# Patient Record
Sex: FEMALE | Race: WHITE | NOT HISPANIC OR LATINO | Employment: UNEMPLOYED | URBAN - NONMETROPOLITAN AREA
[De-identification: names, ages, dates, MRNs, and addresses within clinical notes are randomized per-mention and may not be internally consistent; named-entity substitution may affect disease eponyms.]

---

## 2018-04-04 ENCOUNTER — OFFICE VISIT (OUTPATIENT)
Dept: OBSTETRICS AND GYNECOLOGY | Facility: CLINIC | Age: 48
End: 2018-04-04

## 2018-04-04 ENCOUNTER — APPOINTMENT (OUTPATIENT)
Dept: LAB | Facility: HOSPITAL | Age: 48
End: 2018-04-04

## 2018-04-04 VITALS
SYSTOLIC BLOOD PRESSURE: 128 MMHG | WEIGHT: 239 LBS | DIASTOLIC BLOOD PRESSURE: 68 MMHG | HEIGHT: 71 IN | BODY MASS INDEX: 33.46 KG/M2

## 2018-04-04 DIAGNOSIS — N94.6 DYSMENORRHEA: ICD-10-CM

## 2018-04-04 DIAGNOSIS — R53.83 OTHER FATIGUE: ICD-10-CM

## 2018-04-04 DIAGNOSIS — N92.1 MENORRHAGIA WITH IRREGULAR CYCLE: Primary | ICD-10-CM

## 2018-04-04 LAB
ALBUMIN SERPL-MCNC: 4.2 G/DL (ref 3.4–4.8)
ALBUMIN/GLOB SERPL: 1.3 G/DL (ref 1.1–1.8)
ALP SERPL-CCNC: 79 U/L (ref 38–126)
ALT SERPL W P-5'-P-CCNC: 33 U/L (ref 9–52)
ANION GAP SERPL CALCULATED.3IONS-SCNC: 12 MMOL/L (ref 5–15)
AST SERPL-CCNC: 25 U/L (ref 14–36)
BASOPHILS # BLD AUTO: 0.06 10*3/MM3 (ref 0–0.2)
BASOPHILS NFR BLD AUTO: 0.6 % (ref 0–2)
BILIRUB SERPL-MCNC: <0.1 MG/DL (ref 0.2–1.3)
BUN BLD-MCNC: 18 MG/DL (ref 7–21)
BUN/CREAT SERPL: 30.5 (ref 7–25)
CALCIUM SPEC-SCNC: 9.2 MG/DL (ref 8.4–10.2)
CHLORIDE SERPL-SCNC: 99 MMOL/L (ref 95–110)
CO2 SERPL-SCNC: 26 MMOL/L (ref 22–31)
CREAT BLD-MCNC: 0.59 MG/DL (ref 0.5–1)
DEPRECATED RDW RBC AUTO: 41.9 FL (ref 36.4–46.3)
EOSINOPHIL # BLD AUTO: 0.2 10*3/MM3 (ref 0–0.7)
EOSINOPHIL NFR BLD AUTO: 1.9 % (ref 0–7)
ERYTHROCYTE [DISTWIDTH] IN BLOOD BY AUTOMATED COUNT: 12.8 % (ref 11.5–14.5)
FSH SERPL-ACNC: 6.3 MIU/ML
GFR SERPL CREATININE-BSD FRML MDRD: 109 ML/MIN/1.73 (ref 58–135)
GLOBULIN UR ELPH-MCNC: 3.2 GM/DL (ref 2.3–3.5)
GLUCOSE BLD-MCNC: 111 MG/DL (ref 60–100)
HBA1C MFR BLD: 5.4 % (ref 4–5.6)
HCT VFR BLD AUTO: 37.7 % (ref 35–45)
HGB BLD-MCNC: 13.4 G/DL (ref 12–15.5)
IMM GRANULOCYTES # BLD: 0.03 10*3/MM3 (ref 0–0.02)
IMM GRANULOCYTES NFR BLD: 0.3 % (ref 0–0.5)
LH SERPL-ACNC: 9.87 MIU/ML
LYMPHOCYTES # BLD AUTO: 2.82 10*3/MM3 (ref 0.6–4.2)
LYMPHOCYTES NFR BLD AUTO: 26.9 % (ref 10–50)
MCH RBC QN AUTO: 32 PG (ref 26.5–34)
MCHC RBC AUTO-ENTMCNC: 35.5 G/DL (ref 31.4–36)
MCV RBC AUTO: 90 FL (ref 80–98)
MONOCYTES # BLD AUTO: 0.64 10*3/MM3 (ref 0–0.9)
MONOCYTES NFR BLD AUTO: 6.1 % (ref 0–12)
NEUTROPHILS # BLD AUTO: 6.74 10*3/MM3 (ref 2–8.6)
NEUTROPHILS NFR BLD AUTO: 64.2 % (ref 37–80)
PLATELET # BLD AUTO: 366 10*3/MM3 (ref 150–450)
PMV BLD AUTO: 9.6 FL (ref 8–12)
POTASSIUM BLD-SCNC: 3.7 MMOL/L (ref 3.5–5.1)
PROT SERPL-MCNC: 7.4 G/DL (ref 6.3–8.6)
RBC # BLD AUTO: 4.19 10*6/MM3 (ref 3.77–5.16)
SODIUM BLD-SCNC: 137 MMOL/L (ref 137–145)
TSH SERPL DL<=0.05 MIU/L-ACNC: 1.08 MIU/ML (ref 0.46–4.68)
WBC NRBC COR # BLD: 10.49 10*3/MM3 (ref 3.2–9.8)

## 2018-04-04 PROCEDURE — 83001 ASSAY OF GONADOTROPIN (FSH): CPT | Performed by: NURSE PRACTITIONER

## 2018-04-04 PROCEDURE — 99214 OFFICE O/P EST MOD 30 MIN: CPT | Performed by: NURSE PRACTITIONER

## 2018-04-04 PROCEDURE — 85025 COMPLETE CBC W/AUTO DIFF WBC: CPT | Performed by: NURSE PRACTITIONER

## 2018-04-04 PROCEDURE — 80053 COMPREHEN METABOLIC PANEL: CPT | Performed by: NURSE PRACTITIONER

## 2018-04-04 PROCEDURE — 83525 ASSAY OF INSULIN: CPT | Performed by: NURSE PRACTITIONER

## 2018-04-04 PROCEDURE — 82670 ASSAY OF TOTAL ESTRADIOL: CPT | Performed by: NURSE PRACTITIONER

## 2018-04-04 PROCEDURE — 84144 ASSAY OF PROGESTERONE: CPT | Performed by: NURSE PRACTITIONER

## 2018-04-04 PROCEDURE — 84443 ASSAY THYROID STIM HORMONE: CPT | Performed by: NURSE PRACTITIONER

## 2018-04-04 PROCEDURE — 36415 COLL VENOUS BLD VENIPUNCTURE: CPT | Performed by: NURSE PRACTITIONER

## 2018-04-04 PROCEDURE — 83002 ASSAY OF GONADOTROPIN (LH): CPT | Performed by: NURSE PRACTITIONER

## 2018-04-04 PROCEDURE — 83036 HEMOGLOBIN GLYCOSYLATED A1C: CPT | Performed by: NURSE PRACTITIONER

## 2018-04-04 RX ORDER — MEDROXYPROGESTERONE ACETATE 10 MG/1
10 TABLET ORAL DAILY
Qty: 10 TABLET | Refills: 0 | Status: SHIPPED | OUTPATIENT
Start: 2018-04-04 | End: 2018-04-12

## 2018-04-06 LAB
ESTRADIOL SERPL HS-MCNC: 304.9 PG/ML
INSULIN SERPL-ACNC: 17.5 UIU/ML (ref 2.6–24.9)
PROGEST SERPL-MCNC: <0.1 NG/ML

## 2018-04-10 PROBLEM — N92.1 MENORRHAGIA WITH IRREGULAR CYCLE: Status: ACTIVE | Noted: 2018-04-10

## 2018-04-10 NOTE — PROGRESS NOTES
Subjective   Chief Complaint   Patient presents with   • Menstrual Problem     Maria Fernanda Lopez is a 48 y.o. year old  presenting to be seen with complaints of trouble with her menses.   Current birth control method: none.    Patient's last menstrual period was 2018.    The last time she recalls having predictable regular cycles was 2017. Started a period on  and has had daily bleeding since then. Some days are just light spotting and some she's changing an overnight pad every hour. She's having a lot of cramping and just feels rundown. She has always had slightly irregular periods but they'll be late by 1-3 weeks and she's never had more than 1 per month or had one last for more than about 10 days. She's currently been bleeding for appx 3 months straight.      · Additional notable symptoms: none  · Changes in weight: weight has been stable  · Recent increase in stress? no  · Is there associated pain ? yes    Past 6 month menstrual history:    Cycle Frequency: vary between 28 and 45 days   Menstrual cycle character: flow is typically normal and flow is typically moderately heavy   Cycle Duration: 5 - 7   Number of heavy days of flows: 3   Dysmenorrhea: moderate and affecting her activities of daily living   PMS: moderate and is not affecting her activities of daily living   Intermenstrual bleeding present: {yes   Post-coital bleeding present: no     She is sexually active.  In the past 12 months there has not been new sexual partners.  Condoms are not typically used.  She would not like to be screened for STD's at today's exam.     No Additional Complaints Reported    The following portions of the patient's history were reviewed and updated as appropriate:problem list, current medications, allergies, past family history, past medical history, past social history and past surgical history    Smoking status: Current Every Day Smoker                                                    "Packs/day: 0.50      Years: 20.00        Types: Cigarettes  Smokeless tobacco: Never Used                        Review of Systems   Constitutional: Positive for fatigue. Negative for activity change, appetite change, chills, diaphoresis, fever and unexpected weight change.   HENT: Negative for trouble swallowing and voice change.    Respiratory: Negative for chest tightness and shortness of breath.    Cardiovascular: Negative for chest pain and palpitations.   Gastrointestinal: Negative for abdominal distention, abdominal pain, anal bleeding, blood in stool, nausea and vomiting.   Endocrine: Negative.    Genitourinary: Positive for menstrual problem and vaginal bleeding. Negative for dyspareunia, pelvic pain, vaginal discharge and vaginal pain.   Musculoskeletal: Negative for myalgias.   Skin: Negative for color change and pallor.   Neurological: Negative for dizziness, weakness, light-headedness and headaches.   Hematological: Negative for adenopathy.   Psychiatric/Behavioral: Negative for decreased concentration, dysphoric mood and sleep disturbance. The patient is not nervous/anxious.          Objective   /68   Ht 180.3 cm (71\")   Wt 108 kg (239 lb)   LMP 01/04/2018 Comment: cycle for last 3 months  BMI 33.33 kg/m²     General:  well developed; well nourished  no acute distress  appears older than stated age  obese - Body mass index is 33.33 kg/m².   Skin:  No suspicious lesions seen   Thyroid: normal to inspection and palpation   Lungs:  breathing is unlabored  clear to auscultation bilaterally   Heart:  regular rate and rhythm, S1, S2 normal, no murmur, click, rub or gallop  normal apical impulse   Breasts:  Not performed.   Abdomen: Not performed.   Pelvis: Not performed.     Lab Review   Last pap 6/2015 normal; no other labs available.     Imaging   No data reviewed         Diagnoses and all orders for this visit:    Menorrhagia with irregular cycle  -     CBC Auto Differential  -     Comprehensive " Metabolic Panel  -     Estradiol  -     Follicle Stimulating Hormone  -     Hemoglobin A1c  -     Insulin, Total  -     Luteinizing Hormone  -     Progesterone  -     TSH  -     US Non-ob Transvaginal; Future    Other fatigue    Dysmenorrhea    Other orders  -     medroxyPROGESTERone (PROVERA) 10 MG tablet; Take 1 tablet by mouth Daily.        New Medications Ordered This Visit   Medications   • medroxyPROGESTERone (PROVERA) 10 MG tablet     Sig: Take 1 tablet by mouth Daily.     Dispense:  10 tablet     Refill:  0     Due for pap smear once bleeding stops. Will have labs drawn today; will call with STAT CBC results if needed. Plan for TVUS at next available appt and will f/u with me afterwards for results and to discuss plan. Start on Provera in attempt to decrease bleeding; R/B/A and administration discussed.     This note was electronically signed.    Jade Herron, MOISE    April 10, 2018

## 2018-04-12 ENCOUNTER — OFFICE VISIT (OUTPATIENT)
Dept: OBSTETRICS AND GYNECOLOGY | Facility: CLINIC | Age: 48
End: 2018-04-12

## 2018-04-12 VITALS
HEIGHT: 71 IN | DIASTOLIC BLOOD PRESSURE: 79 MMHG | HEART RATE: 91 BPM | SYSTOLIC BLOOD PRESSURE: 120 MMHG | BODY MASS INDEX: 33.46 KG/M2 | WEIGHT: 239 LBS

## 2018-04-12 DIAGNOSIS — D25.9 UTERINE LEIOMYOMA, UNSPECIFIED LOCATION: ICD-10-CM

## 2018-04-12 DIAGNOSIS — N92.1 MENORRHAGIA WITH IRREGULAR CYCLE: ICD-10-CM

## 2018-04-12 DIAGNOSIS — N94.6 DYSMENORRHEA: ICD-10-CM

## 2018-04-12 DIAGNOSIS — Z01.411 ENCOUNTER FOR GYNECOLOGICAL EXAMINATION WITH ABNORMAL FINDING: Primary | ICD-10-CM

## 2018-04-12 DIAGNOSIS — Z30.011 ENCOUNTER FOR INITIAL PRESCRIPTION OF CONTRACEPTIVE PILLS: ICD-10-CM

## 2018-04-12 PROCEDURE — 99396 PREV VISIT EST AGE 40-64: CPT | Performed by: NURSE PRACTITIONER

## 2018-04-12 PROCEDURE — G0123 SCREEN CERV/VAG THIN LAYER: HCPCS | Performed by: NURSE PRACTITIONER

## 2018-04-12 PROCEDURE — 87624 HPV HI-RISK TYP POOLED RSLT: CPT | Performed by: NURSE PRACTITIONER

## 2018-04-12 RX ORDER — ACETAMINOPHEN AND CODEINE PHOSPHATE 120; 12 MG/5ML; MG/5ML
1 SOLUTION ORAL DAILY
Qty: 28 TABLET | Refills: 12 | Status: SHIPPED | OUTPATIENT
Start: 2018-04-12 | End: 2018-05-01

## 2018-04-12 NOTE — PROGRESS NOTES
Subjective   Maria Fernanda Lopez is a 48 y.o. She is due for a pap smear but is here for her ultrasound results. Still having some spotting but has improved since first seen last week.    LMP- 1/4 to current. Has not had 1 day without bleeding since January. Some days are spotting and others are excessive. She has a lot of pain as well.  Last pap- 6/15/15 normal      Menstrual Problem   This is a recurrent problem. The current episode started more than 1 month ago. The problem occurs constantly. The problem has been waxing and waning. Associated symptoms include abdominal pain, fatigue and weakness. Pertinent negatives include no anorexia, chest pain, diaphoresis, headaches, myalgias, nausea, urinary symptoms or vomiting. Nothing aggravates the symptoms. She has tried nothing for the symptoms.       The following portions of the patient's history were reviewed and updated as appropriate: allergies, current medications, past family history, past medical history, past social history, past surgical history and problem list.    Review of Systems   Constitutional: Positive for fatigue. Negative for diaphoresis.   Respiratory: Negative for chest tightness and shortness of breath.    Cardiovascular: Negative for chest pain and palpitations.   Gastrointestinal: Positive for abdominal pain. Negative for abdominal distention, anorexia, nausea and vomiting.   Genitourinary: Positive for dyspareunia, menstrual problem, pelvic pain and vaginal bleeding. Negative for vaginal discharge and vaginal pain.   Musculoskeletal: Negative for myalgias.   Skin: Negative for color change and pallor.   Neurological: Positive for weakness. Negative for dizziness, light-headedness and headaches.       Objective   Physical Exam   Constitutional: She is oriented to person, place, and time. She appears well-developed and well-nourished.   Neck: No thyromegaly present.   Cardiovascular: Normal rate, regular rhythm, normal heart sounds and intact  distal pulses.    Pulmonary/Chest: Effort normal and breath sounds normal. Right breast exhibits no inverted nipple, no mass, no nipple discharge, no skin change and no tenderness. Left breast exhibits no inverted nipple, no mass, no nipple discharge, no skin change and no tenderness. Breasts are symmetrical.   Abdominal: Soft. Bowel sounds are normal. She exhibits no distension. There is no tenderness.   Genitourinary: No breast discharge or bleeding. There is no rash, tenderness, lesion or injury on the right labia. There is no rash, tenderness, lesion or injury on the left labia. Cervix exhibits no motion tenderness, no discharge and no friability. Right adnexum displays no mass, no tenderness and no fullness. Left adnexum displays no mass, no tenderness and no fullness. There is bleeding in the vagina. No erythema or tenderness in the vagina. No foreign body in the vagina. No signs of injury around the vagina. No vaginal discharge found.   Genitourinary Comments: Light spotting noted. Pap smear obtained. Exam limited 2/2 body habitus.   Lymphadenopathy:     She has no axillary adenopathy.        Right: No inguinal adenopathy present.        Left: No inguinal adenopathy present.   Neurological: She is alert and oriented to person, place, and time.   Skin: Skin is warm, dry and intact.   Psychiatric: She has a normal mood and affect. Her speech is normal and behavior is normal.   Nursing note and vitals reviewed.    Preliminary pelvic u/s revealed a normal sized uterus with endometrial thickness of 4.2mm. The right ovary is WNL and the left ovary is enlarged and contains at 2.8cm simple cyst. There are probable fibroids within the uterus; the largest is noted in the fundus and measures 3.77cm.     Component      Latest Ref Rng & Units 4/4/2018   WBC      3.20 - 9.80 10*3/mm3 10.49 (H)   RBC      3.77 - 5.16 10*6/mm3 4.19   Hemoglobin      12.0 - 15.5 g/dL 13.4   Hematocrit      35.0 - 45.0 % 37.7   MCV      80.0 -  98.0 fL 90.0   MCH      26.5 - 34.0 pg 32.0   MCHC      31.4 - 36.0 g/dL 35.5   RDW      11.5 - 14.5 % 12.8   RDW-SD      36.4 - 46.3 fl 41.9   MPV      8.0 - 12.0 fL 9.6   Platelets      150 - 450 10*3/mm3 366   Neutrophil %      37.0 - 80.0 % 64.2   Lymphocyte %      10.0 - 50.0 % 26.9   Monocyte %      0.0 - 12.0 % 6.1   Eosinophil %      0.0 - 7.0 % 1.9   Basophil %      0.0 - 2.0 % 0.6   Immature Grans %      0.0 - 0.5 % 0.3   Neutrophils, Absolute      2.00 - 8.60 10*3/mm3 6.74   Lymphocytes, Absolute      0.60 - 4.20 10*3/mm3 2.82   Monocytes, Absolute      0.00 - 0.90 10*3/mm3 0.64   Eosinophils, Absolute      0.00 - 0.70 10*3/mm3 0.20   Basophils, Absolute      0.00 - 0.20 10*3/mm3 0.06   Immature Grans, Absolute      0.00 - 0.02 10*3/mm3 0.03 (H)   Glucose      60 - 100 mg/dL 111 (H)   BUN      7 - 21 mg/dL 18   Creatinine      0.50 - 1.00 mg/dL 0.59   Sodium      137 - 145 mmol/L 137   Potassium      3.5 - 5.1 mmol/L 3.7   Chloride      95 - 110 mmol/L 99   CO2      22.0 - 31.0 mmol/L 26.0   Calcium      8.4 - 10.2 mg/dL 9.2   Total Protein      6.3 - 8.6 g/dL 7.4   Albumin      3.40 - 4.80 g/dL 4.20   ALT (SGPT)      9 - 52 U/L 33   AST (SGOT)      14 - 36 U/L 25   Alkaline Phosphatase      38 - 126 U/L 79   Total Bilirubin      0.2 - 1.3 mg/dL <0.1 (L)   eGFR Non  Amer      58 - 135 mL/min/1.73 109   Globulin      2.3 - 3.5 gm/dL 3.2   A/G Ratio      1.1 - 1.8 g/dL 1.3   BUN/Creatinine Ratio      7.0 - 25.0 30.5 (H)   Anion Gap      5.0 - 15.0 mmol/L 12.0   Estradiol      pg/mL 304.9   FSH      mIU/mL 6.30   Hemoglobin A1C      4 - 5.6 % 5.4   Insulin      2.6 - 24.9 uIU/mL 17.5   LH      mIU/mL 9.87   Progesterone      ng/mL <0.1   TSH Baseline      0.460 - 4.680 mIU/mL 1.080     Labs WNL except for the low serum progesterone.     Assessment/Plan   Maria Fernanda was seen today for follow-up.    Diagnoses and all orders for this visit:    Encounter for gynecological examination with abnormal finding  -      Liquid-based Pap Smear, Screening    Uterine leiomyoma, unspecified location    Menorrhagia with irregular cycle  -     norethindrone (MICRONOR) 0.35 MG tablet; Take 1 tablet by mouth Daily.    Encounter for initial prescription of contraceptive pills    Dysmenorrhea      We discussed her u/s, lab results and treatment options including POPs, Depo, Nexplanon, IUD, or surgical management. She wants to try the POPs for now but would also like to know more about her surgical options since she has so much pain as well. R/B/A and administration of POPs discussed. See MD for surgical options at next available appointment .

## 2018-04-16 LAB
LAB AP CASE REPORT: NORMAL
LAB AP GYN ADDITIONAL INFORMATION: NORMAL
LAB AP GYN OTHER FINDINGS: NORMAL
Lab: NORMAL
PATH INTERP SPEC-IMP: NORMAL
STAT OF ADQ CVX/VAG CYTO-IMP: NORMAL

## 2018-04-20 LAB — HPV I/H RISK 4 DNA CVX QL PROBE+SIG AMP: POSITIVE

## 2018-04-28 ENCOUNTER — HOSPITAL ENCOUNTER (EMERGENCY)
Facility: HOSPITAL | Age: 48
Discharge: HOME | End: 2018-04-28
Attending: EMERGENCY MEDICINE | Admitting: EMERGENCY MEDICINE

## 2018-04-28 ENCOUNTER — APPOINTMENT (OUTPATIENT)
Dept: GENERAL RADIOLOGY | Facility: HOSPITAL | Age: 48
End: 2018-04-28

## 2018-04-28 VITALS
BODY MASS INDEX: 30.8 KG/M2 | HEART RATE: 82 BPM | DIASTOLIC BLOOD PRESSURE: 71 MMHG | OXYGEN SATURATION: 96 % | RESPIRATION RATE: 18 BRPM | SYSTOLIC BLOOD PRESSURE: 126 MMHG | TEMPERATURE: 98.2 F | WEIGHT: 220 LBS | HEIGHT: 71 IN

## 2018-04-28 DIAGNOSIS — S76.312A PARTIAL HAMSTRING TEAR, LEFT, INITIAL ENCOUNTER: Primary | ICD-10-CM

## 2018-04-28 PROCEDURE — 25010000002 MORPHINE PER 10 MG

## 2018-04-28 PROCEDURE — 96372 THER/PROPH/DIAG INJ SC/IM: CPT

## 2018-04-28 PROCEDURE — 99283 EMERGENCY DEPT VISIT LOW MDM: CPT

## 2018-04-28 PROCEDURE — 73552 X-RAY EXAM OF FEMUR 2/>: CPT

## 2018-04-28 PROCEDURE — 25010000002 MORPHINE PER 10 MG: Performed by: EMERGENCY MEDICINE

## 2018-04-28 PROCEDURE — 25010000002 KETOROLAC TROMETHAMINE PER 15 MG: Performed by: EMERGENCY MEDICINE

## 2018-04-28 RX ORDER — ONDANSETRON 4 MG/1
4 TABLET, ORALLY DISINTEGRATING ORAL ONCE
Status: COMPLETED | OUTPATIENT
Start: 2018-04-28 | End: 2018-04-28

## 2018-04-28 RX ORDER — MORPHINE SULFATE 4 MG/ML
INJECTION, SOLUTION INTRAMUSCULAR; INTRAVENOUS
Status: COMPLETED
Start: 2018-04-28 | End: 2018-04-28

## 2018-04-28 RX ORDER — NAPROXEN 500 MG/1
500 TABLET ORAL 2 TIMES DAILY PRN
Qty: 30 TABLET | Refills: 0 | Status: SHIPPED | OUTPATIENT
Start: 2018-04-28 | End: 2018-05-16

## 2018-04-28 RX ORDER — KETOROLAC TROMETHAMINE 30 MG/ML
30 INJECTION, SOLUTION INTRAMUSCULAR; INTRAVENOUS ONCE
Status: COMPLETED | OUTPATIENT
Start: 2018-04-28 | End: 2018-04-28

## 2018-04-28 RX ORDER — HYDROCODONE BITARTRATE AND ACETAMINOPHEN 5; 325 MG/1; MG/1
1 TABLET ORAL EVERY 6 HOURS PRN
Qty: 15 TABLET | Refills: 0 | Status: SHIPPED | OUTPATIENT
Start: 2018-04-28 | End: 2018-05-01 | Stop reason: SDUPTHER

## 2018-04-28 RX ORDER — MORPHINE SULFATE 4 MG/ML
8 INJECTION, SOLUTION INTRAMUSCULAR; INTRAVENOUS ONCE
Status: COMPLETED | OUTPATIENT
Start: 2018-04-28 | End: 2018-04-28

## 2018-04-28 RX ORDER — MORPHINE SULFATE 2 MG/ML
8 INJECTION, SOLUTION INTRAMUSCULAR; INTRAVENOUS ONCE
Status: DISCONTINUED | OUTPATIENT
Start: 2018-04-28 | End: 2018-04-28 | Stop reason: SDUPTHER

## 2018-04-28 RX ADMIN — MORPHINE SULFATE 8 MG: 4 INJECTION, SOLUTION INTRAMUSCULAR; INTRAVENOUS at 16:45

## 2018-04-28 RX ADMIN — ONDANSETRON 4 MG: 4 TABLET, ORALLY DISINTEGRATING ORAL at 15:36

## 2018-04-28 RX ADMIN — MORPHINE SULFATE 8 MG: 4 INJECTION, SOLUTION INTRAMUSCULAR; INTRAVENOUS at 14:59

## 2018-04-28 RX ADMIN — KETOROLAC TROMETHAMINE 30 MG: 30 INJECTION, SOLUTION INTRAMUSCULAR; INTRAVENOUS at 15:00

## 2018-05-01 ENCOUNTER — OFFICE VISIT (OUTPATIENT)
Dept: ORTHOPEDIC SURGERY | Facility: CLINIC | Age: 48
End: 2018-05-01

## 2018-05-01 VITALS — BODY MASS INDEX: 33.6 KG/M2 | WEIGHT: 240 LBS | HEIGHT: 71 IN

## 2018-05-01 DIAGNOSIS — S76.302A LEFT HAMSTRING INJURY, INITIAL ENCOUNTER: ICD-10-CM

## 2018-05-01 DIAGNOSIS — M79.605 PAIN OF LEFT LOWER EXTREMITY: Primary | ICD-10-CM

## 2018-05-01 PROCEDURE — 99214 OFFICE O/P EST MOD 30 MIN: CPT | Performed by: NURSE PRACTITIONER

## 2018-05-01 RX ORDER — TIZANIDINE HYDROCHLORIDE 4 MG/1
4 CAPSULE, GELATIN COATED ORAL 3 TIMES DAILY PRN
Qty: 90 CAPSULE | Refills: 1 | Status: SHIPPED | OUTPATIENT
Start: 2018-05-01 | End: 2018-05-31

## 2018-05-01 RX ORDER — HYDROCODONE BITARTRATE AND ACETAMINOPHEN 5; 325 MG/1; MG/1
1 TABLET ORAL EVERY 6 HOURS PRN
Qty: 40 TABLET | Refills: 0 | Status: SHIPPED | OUTPATIENT
Start: 2018-05-01 | End: 2018-05-16

## 2018-05-01 NOTE — PROGRESS NOTES
Maria Fernanda Lopez is a 48 y.o. female   Primary provider:  No Known Provider       Chief Complaint   Patient presents with   • Left Leg - Pain     Torn Hamstring,  Xray 4/28/18  [facility]       HISTORY OF PRESENT ILLNESS:    48-year-old female patient presents to office for evaluation of left posterior leg pain/injury.  Initial injury occurred while bowling on 4/28/2018.  Patient reports that she went to throw her bowling ball and her left leg hyperextended forward and outward. Patient reports she felt a tear and immediate, severe pain in her posterior upper leg and difficulty ambulating.  Patient was seen in the ED on that date with x-rays done crutches provided.  Patient was prescribed Norco and Naproxen for pain.  Pain is described as constant and severe.  Pain is described as crushing and aching in nature with associated swelling of her posterior upper left leg.  Pain is worse with movement of her left leg, standing, walking and sitting.  Pain improves some with crutches, rest, anti-inflammatory medication and pain medication.       Pain   This is a new problem. Episode onset: 4/28/18, felt a tear in her leg while bowling. The problem occurs constantly. The problem has been unchanged. Associated symptoms include weakness (Left leg). Associated symptoms comments: Aching, swelling . The symptoms are aggravated by walking and standing (sitting, movement/use of left leg). She has tried NSAIDs, oral narcotics and rest for the symptoms. The treatment provided mild relief.        CONCURRENT MEDICAL HISTORY:    No past medical history on file.    No Known Allergies      Current Outpatient Prescriptions:   •  albuterol (PROVENTIL HFA;VENTOLIN HFA) 108 (90 Base) MCG/ACT inhaler, Inhale 2 puffs Every 4 (Four) Hours As Needed for Wheezing or Shortness of Air., Disp: 1 inhaler, Rfl: 0  •  HYDROcodone-acetaminophen (NORCO) 5-325 MG per tablet, Take 1 tablet by mouth Every 6 (Six) Hours As Needed for Severe Pain .,  "Disp: 40 tablet, Rfl: 0  •  naproxen (NAPROSYN) 500 MG tablet, Take 1 tablet by mouth 2 (Two) Times a Day As Needed for Mild Pain ., Disp: 30 tablet, Rfl: 0  •  TiZANidine (ZANAFLEX) 4 MG capsule, Take 1 capsule by mouth 3 (Three) Times a Day As Needed for Muscle Spasms., Disp: 90 capsule, Rfl: 1    No past surgical history on file.    No family history on file.    Social History     Social History   • Marital status: Significant Other     Spouse name: N/A   • Number of children: N/A   • Years of education: N/A     Occupational History   • Not on file.     Social History Main Topics   • Smoking status: Current Every Day Smoker     Packs/day: 0.50     Years: 20.00     Types: Cigarettes   • Smokeless tobacco: Never Used   • Alcohol use No   • Drug use: No   • Sexual activity: Not Currently     Other Topics Concern   • Not on file     Social History Narrative   • No narrative on file        Review of Systems   HENT: Negative.    Eyes: Negative.    Respiratory: Negative.    Cardiovascular: Negative.    Gastrointestinal: Negative.    Endocrine: Negative.    Genitourinary: Negative.    Musculoskeletal: Positive for gait problem.        Left posterior upper leg pain.    Skin: Negative.    Allergic/Immunologic: Negative.    Neurological: Positive for weakness (Left leg).   Hematological: Negative.    Psychiatric/Behavioral: Negative.        PHYSICAL EXAMINATION:       Ht 180.3 cm (71\")   Wt 109 kg (240 lb)   LMP 04/12/2018 (Approximate)   BMI 33.47 kg/m²     Physical Exam   Constitutional: She is oriented to person, place, and time. Vital signs are normal. She appears well-developed and well-nourished. She is active and cooperative. She does not appear ill. No distress.   HENT:   Head: Normocephalic.   Pulmonary/Chest: Effort normal. No respiratory distress.   Abdominal: Soft. She exhibits no distension.   Musculoskeletal: She exhibits edema (Mild, left upper posterior leg) and tenderness (Left posterior upper leg). She " exhibits no deformity.   Neurological: She is alert and oriented to person, place, and time. GCS eye subscore is 4. GCS verbal subscore is 5. GCS motor subscore is 6.   Skin: Skin is warm, dry and intact. Capillary refill takes less than 2 seconds.   Psychiatric: She has a normal mood and affect. Her speech is normal and behavior is normal. Judgment and thought content normal. Cognition and memory are normal.   Vitals reviewed.      GAIT:     []  Normal  [x]  Antalgic    Assistive device: [x]  None  []  Walker     []  Crutches  []  Cane     []  Wheelchair  []  Stretcher    Right Hip Exam     Tenderness   The patient is experiencing no tenderness.         Range of Motion   The patient has normal right hip ROM.    Muscle Strength   The patient has normal right hip strength.    Other   Erythema: absent  Sensation: normal  Pulse: present      Left Hip Exam     Tenderness   The patient is experiencing tenderness in the posterior (upper leg, hamstring).    Range of Motion   Flexion: abnormal   Abduction: abnormal   Adduction: abnormal     Other   Erythema: absent  Sensation: normal  Pulse: present    Comments:  Diffuse tenderness to left hamstring. Small area of ecchymosis noted to posterior upper leg. Mild, generalized swelling. Pain with range of motion.             Xr Femur 2 View Left    Result Date: 4/28/2018  Narrative: EXAM DESCRIPTION: Left femur two views. CLINICAL HISTORY: Status post trauma. COMPARISON: None FINDINGS:  AP and lateral projections of the left femur are obtained. The alignment and mineralization are normal. The articular surfaces are smooth. No evidence of fracture, dislocation, or suspicious osseous lesion.     Impression: Negative for fracture or dislocation. Electronically signed by:  Mati Gutierrez MD  4/28/2018 3:08 PM CDT Workstation: [id number]        ASSESSMENT:    Diagnoses and all orders for this visit:    Pain of left lower extremity    Left hamstring injury, initial encounter    Other  orders  -     HYDROcodone-acetaminophen (NORCO) 5-325 MG per tablet; Take 1 tablet by mouth Every 6 (Six) Hours As Needed for Severe Pain .  -     TiZANidine (ZANAFLEX) 4 MG capsule; Take 1 capsule by mouth 3 (Three) Times a Day As Needed for Muscle Spasms.    PLAN    X-rays of left femur done in the ED on 4/28/2018 reviewed today. Mechanism of injury, subjective complaints and physical exam are most consistent with left hamstring injury. Discussed tear versus strain. Discussed MRI for definitive diagnosis and the fact that it would likely not change the treatment recommendations or plan of care. Patient is in agreement with waiting/deferring MRI for now. Recommend to continue with rest and modified activity. Recommend to continue with modified weight-bearing with use of her crutches. She is not using them in office today and is limping around when she ambulates. Discussed importance of using her crutches. Recommend to continue Norco as needed for moderate to severe pain. This is refilled today for patient. Recommend to continue Naproxen twice daily, as previously prescribed, for consistent dosing of oral NSAIDs. Zanaflex is also prescribed as needed for pain/muscle spasms.  Recommend ice therapy to the left hamstring intermittently 3-4 times daily for 20 minutes at a time.  Also discussed with patient alternating with heat therapy as needed for muscle spasms.  Follow-up in 4 weeks for recheck.  Consider referral to physical therapy at that time unless she has had significant improvement.    This patient has tried treatment modalities that include anti-inflammatory medication, Tylenol, rest, modified activity, modified weight-bearing with use of crutches and has sustained a traumatic injury.  Therefore, I will recommend a short course of narcotic pain medication for this patient until their pain has been sufficiently reduced to a level that I deem acceptable to be treated with alternative treatment options.  JH  reviewed # [id number].     Return in about 4 weeks (around 5/29/2018) for Recheck.      This document has been electronically signed by MOISE Jerome on May 1, 2018 12:06 PM      MOISE Jerome

## 2018-05-16 ENCOUNTER — OFFICE VISIT (OUTPATIENT)
Dept: OBSTETRICS AND GYNECOLOGY | Facility: CLINIC | Age: 48
End: 2018-05-16

## 2018-05-16 ENCOUNTER — PREP FOR SURGERY (OUTPATIENT)
Dept: OTHER | Facility: HOSPITAL | Age: 48
End: 2018-05-16

## 2018-05-16 VITALS
HEIGHT: 69 IN | SYSTOLIC BLOOD PRESSURE: 110 MMHG | BODY MASS INDEX: 35.55 KG/M2 | DIASTOLIC BLOOD PRESSURE: 80 MMHG | WEIGHT: 240 LBS

## 2018-05-16 DIAGNOSIS — N93.9 ABNORMAL UTERINE BLEEDING (AUB): Primary | ICD-10-CM

## 2018-05-16 DIAGNOSIS — D25.1 INTRAMURAL LEIOMYOMA OF UTERUS: ICD-10-CM

## 2018-05-16 DIAGNOSIS — N94.6 DYSMENORRHEA: ICD-10-CM

## 2018-05-16 DIAGNOSIS — N94.10 DYSPAREUNIA IN FEMALE: ICD-10-CM

## 2018-05-16 PROCEDURE — 99213 OFFICE O/P EST LOW 20 MIN: CPT | Performed by: OBSTETRICS & GYNECOLOGY

## 2018-05-16 RX ORDER — SODIUM CHLORIDE 0.9 % (FLUSH) 0.9 %
1-10 SYRINGE (ML) INJECTION AS NEEDED
Status: CANCELLED | OUTPATIENT
Start: 2018-08-02

## 2018-05-16 NOTE — PROGRESS NOTES
Subjective   Maria Fernanda Lopez is a 48 y.o. female.     Patient presents today to discuss AUB which has included continuous bleeding for the past 4 mo.  Reports that her periods have always been heavy secondary to fibroids and that she would like a hysterectomy.  Previously OCPs have not been helpful for bleeding and a recent attempt at conservative management with OCPs was not effective.  G0  Discussed R/B/A for hysterectomy including risks for bleeding, infection, blood clot formation and possible damage to abdominal/pelvic structures. Patient would like to proceed with LAVH/BSO.  Discussed expectation for resolution of bleeding with no guarantee for improvement in pain. Discussed expectation for some degree of menopause related symptoms with removal of both ovaries including possible hot flashes, night sweats, insomnia, vaginal dryness, decreased libido and mood changes. Discussed significant potential for laparotomy during surgery based on today's physical exam and nulliparity.          The following portions of the patient's history were reviewed and updated as appropriate: allergies, current medications, past family history, past medical history, past social history, past surgical history and problem list.      Review of Systems   Genitourinary: Positive for dyspareunia, menstrual problem, pelvic pain and vaginal bleeding.       Objective   Physical Exam   Constitutional: She is oriented to person, place, and time. She appears well-developed and well-nourished. No distress.   HENT:   Head: Normocephalic and atraumatic.   Right Ear: External ear normal.   Left Ear: External ear normal.   Nose: Nose normal.   Mouth/Throat: Oropharynx is clear and moist.   Genitourinary:   Genitourinary Comments: Somewhat limited descensus with narrow vaginal canal.   Neurological: She is alert and oriented to person, place, and time.   Skin: She is not diaphoretic.   Psychiatric: She has a normal mood and affect. Her  behavior is normal. Judgment and thought content normal.       Assessment/Plan   Maria Fernanda was seen today for consult.    Diagnoses and all orders for this visit:    Abnormal uterine bleeding (AUB)    Intramural leiomyoma of uterus         LAVH/BSO

## 2018-05-17 ENCOUNTER — HOSPITAL ENCOUNTER (OUTPATIENT)
Facility: HOSPITAL | Age: 48
Setting detail: HOSPITAL OUTPATIENT SURGERY
End: 2018-05-17
Attending: OBSTETRICS & GYNECOLOGY | Admitting: OBSTETRICS & GYNECOLOGY

## 2018-05-17 ENCOUNTER — TELEPHONE (OUTPATIENT)
Dept: OBSTETRICS AND GYNECOLOGY | Facility: CLINIC | Age: 48
End: 2018-05-17

## 2018-05-17 PROBLEM — N94.10 DYSPAREUNIA IN FEMALE: Status: ACTIVE | Noted: 2018-05-17

## 2018-05-17 PROBLEM — N93.9 ABNORMAL UTERINE BLEEDING (AUB): Status: ACTIVE | Noted: 2018-05-17

## 2018-05-17 NOTE — TELEPHONE ENCOUNTER
----- Message from José Antonio Espinoza MD sent at 5/16/2018  3:32 PM CDT -----  Please schedule for LAVH/BSO  I called this patient and set up her surgery for Aug 2nd as she requested.

## 2018-05-18 RX ORDER — HYDROCODONE BITARTRATE AND ACETAMINOPHEN 5; 325 MG/1; MG/1
TABLET ORAL
Qty: 40 TABLET | Refills: 0 | Status: SHIPPED | OUTPATIENT
Start: 2018-05-18 | End: 2018-05-31

## 2018-05-31 ENCOUNTER — OFFICE VISIT (OUTPATIENT)
Dept: ORTHOPEDIC SURGERY | Facility: CLINIC | Age: 48
End: 2018-05-31

## 2018-05-31 VITALS — WEIGHT: 238 LBS | BODY MASS INDEX: 35.25 KG/M2 | HEIGHT: 69 IN

## 2018-05-31 DIAGNOSIS — M79.605 PAIN OF LEFT LOWER EXTREMITY: Primary | ICD-10-CM

## 2018-05-31 DIAGNOSIS — S76.302D LEFT HAMSTRING INJURY, SUBSEQUENT ENCOUNTER: ICD-10-CM

## 2018-05-31 PROCEDURE — 99214 OFFICE O/P EST MOD 30 MIN: CPT | Performed by: NURSE PRACTITIONER

## 2018-05-31 RX ORDER — TIZANIDINE HYDROCHLORIDE 4 MG/1
4 CAPSULE, GELATIN COATED ORAL 3 TIMES DAILY
Qty: 60 CAPSULE | Refills: 1 | Status: SHIPPED | OUTPATIENT
Start: 2018-05-31 | End: 2019-05-10

## 2018-05-31 RX ORDER — HYDROCODONE BITARTRATE AND ACETAMINOPHEN 5; 325 MG/1; MG/1
1 TABLET ORAL EVERY 8 HOURS PRN
Qty: 40 TABLET | Refills: 0 | Status: SHIPPED | OUTPATIENT
Start: 2018-05-31 | End: 2018-06-10

## 2018-06-13 ENCOUNTER — HOSPITAL ENCOUNTER (OUTPATIENT)
Dept: PHYSICAL THERAPY | Facility: HOSPITAL | Age: 48
Setting detail: THERAPIES SERIES
Discharge: HOME | End: 2018-06-13

## 2018-06-13 DIAGNOSIS — S76.302D LEFT HAMSTRING INJURY, SUBSEQUENT ENCOUNTER: Primary | ICD-10-CM

## 2018-06-13 DIAGNOSIS — M79.605 PAIN OF LEFT LOWER EXTREMITY: ICD-10-CM

## 2018-06-13 PROCEDURE — 97110 THERAPEUTIC EXERCISES: CPT | Performed by: PHYSICAL THERAPIST

## 2018-06-13 PROCEDURE — 97161 PT EVAL LOW COMPLEX 20 MIN: CPT | Performed by: PHYSICAL THERAPIST
